# Patient Record
Sex: FEMALE | Race: WHITE | NOT HISPANIC OR LATINO | Employment: FULL TIME | URBAN - METROPOLITAN AREA
[De-identification: names, ages, dates, MRNs, and addresses within clinical notes are randomized per-mention and may not be internally consistent; named-entity substitution may affect disease eponyms.]

---

## 2023-04-19 LAB
ANION GAP IN SER/PLAS: 10 MMOL/L (ref 10–20)
CALCIUM (MG/DL) IN SER/PLAS: 9.5 MG/DL (ref 8.6–10.6)
CARBON DIOXIDE, TOTAL (MMOL/L) IN SER/PLAS: 33 MMOL/L (ref 21–32)
CHLORIDE (MMOL/L) IN SER/PLAS: 102 MMOL/L (ref 98–107)
CREATININE (MG/DL) IN SER/PLAS: 0.78 MG/DL (ref 0.5–1.05)
GFR FEMALE: >90 ML/MIN/1.73M2
GLUCOSE (MG/DL) IN SER/PLAS: 86 MG/DL (ref 74–99)
POTASSIUM (MMOL/L) IN SER/PLAS: 4.1 MMOL/L (ref 3.5–5.3)
SODIUM (MMOL/L) IN SER/PLAS: 141 MMOL/L (ref 136–145)
UREA NITROGEN (MG/DL) IN SER/PLAS: 12 MG/DL (ref 6–23)

## 2023-04-25 LAB — SARS-COV-2 RESULT: NOT DETECTED

## 2023-04-27 ENCOUNTER — HOSPITAL ENCOUNTER (OUTPATIENT)
Dept: DATA CONVERSION | Facility: HOSPITAL | Age: 43
End: 2023-04-28
Attending: ORTHOPAEDIC SURGERY | Admitting: ORTHOPAEDIC SURGERY
Payer: COMMERCIAL

## 2023-04-27 DIAGNOSIS — E78.5 HYPERLIPIDEMIA, UNSPECIFIED: ICD-10-CM

## 2023-04-27 DIAGNOSIS — F32.A DEPRESSION, UNSPECIFIED: ICD-10-CM

## 2023-04-27 DIAGNOSIS — M25.551 PAIN IN RIGHT HIP: ICD-10-CM

## 2023-04-27 DIAGNOSIS — Z87.891 PERSONAL HISTORY OF NICOTINE DEPENDENCE: ICD-10-CM

## 2023-04-27 DIAGNOSIS — M16.11 UNILATERAL PRIMARY OSTEOARTHRITIS, RIGHT HIP: ICD-10-CM

## 2023-04-27 DIAGNOSIS — F41.9 ANXIETY DISORDER, UNSPECIFIED: ICD-10-CM

## 2023-04-27 LAB
ANION GAP IN SER/PLAS: 11 MMOL/L (ref 10–20)
CALCIUM (MG/DL) IN SER/PLAS: 8.3 MG/DL (ref 8.6–10.6)
CARBON DIOXIDE, TOTAL (MMOL/L) IN SER/PLAS: 26 MMOL/L (ref 21–32)
CHLORIDE (MMOL/L) IN SER/PLAS: 104 MMOL/L (ref 98–107)
CREATININE (MG/DL) IN SER/PLAS: 0.72 MG/DL (ref 0.5–1.05)
GFR FEMALE: >90 ML/MIN/1.73M2
GLUCOSE (MG/DL) IN SER/PLAS: 137 MG/DL (ref 74–99)
POTASSIUM (MMOL/L) IN SER/PLAS: 3.7 MMOL/L (ref 3.5–5.3)
SODIUM (MMOL/L) IN SER/PLAS: 137 MMOL/L (ref 136–145)
UREA NITROGEN (MG/DL) IN SER/PLAS: 10 MG/DL (ref 6–23)

## 2023-04-28 LAB
ANION GAP IN SER/PLAS: 9 MMOL/L (ref 10–20)
BASOPHILS (10*3/UL) IN BLOOD BY AUTOMATED COUNT: 0.03 X10E9/L (ref 0–0.1)
BASOPHILS/100 LEUKOCYTES IN BLOOD BY AUTOMATED COUNT: 0.4 % (ref 0–2)
CALCIUM (MG/DL) IN SER/PLAS: 7.9 MG/DL (ref 8.6–10.6)
CARBON DIOXIDE, TOTAL (MMOL/L) IN SER/PLAS: 28 MMOL/L (ref 21–32)
CHLORIDE (MMOL/L) IN SER/PLAS: 104 MMOL/L (ref 98–107)
CREATININE (MG/DL) IN SER/PLAS: 0.7 MG/DL (ref 0.5–1.05)
EOSINOPHILS (10*3/UL) IN BLOOD BY AUTOMATED COUNT: 0.14 X10E9/L (ref 0–0.7)
EOSINOPHILS/100 LEUKOCYTES IN BLOOD BY AUTOMATED COUNT: 1.7 % (ref 0–6)
ERYTHROCYTE DISTRIBUTION WIDTH (RATIO) BY AUTOMATED COUNT: 13.5 % (ref 11.5–14.5)
ERYTHROCYTE MEAN CORPUSCULAR HEMOGLOBIN CONCENTRATION (G/DL) BY AUTOMATED: 31.9 G/DL (ref 32–36)
ERYTHROCYTE MEAN CORPUSCULAR VOLUME (FL) BY AUTOMATED COUNT: 93 FL (ref 80–100)
ERYTHROCYTES (10*6/UL) IN BLOOD BY AUTOMATED COUNT: 3.24 X10E12/L (ref 4–5.2)
GFR FEMALE: >90 ML/MIN/1.73M2
GLUCOSE (MG/DL) IN SER/PLAS: 93 MG/DL (ref 74–99)
HEMATOCRIT (%) IN BLOOD BY AUTOMATED COUNT: 30.1 % (ref 36–46)
HEMOGLOBIN (G/DL) IN BLOOD: 9.6 G/DL (ref 12–16)
IMMATURE GRANULOCYTES/100 LEUKOCYTES IN BLOOD BY AUTOMATED COUNT: 0.4 % (ref 0–0.9)
LEUKOCYTES (10*3/UL) IN BLOOD BY AUTOMATED COUNT: 8.3 X10E9/L (ref 4.4–11.3)
LYMPHOCYTES (10*3/UL) IN BLOOD BY AUTOMATED COUNT: 1.5 X10E9/L (ref 1.2–4.8)
LYMPHOCYTES/100 LEUKOCYTES IN BLOOD BY AUTOMATED COUNT: 18 % (ref 13–44)
MONOCYTES (10*3/UL) IN BLOOD BY AUTOMATED COUNT: 0.64 X10E9/L (ref 0.1–1)
MONOCYTES/100 LEUKOCYTES IN BLOOD BY AUTOMATED COUNT: 7.7 % (ref 2–10)
NEUTROPHILS (10*3/UL) IN BLOOD BY AUTOMATED COUNT: 6 X10E9/L (ref 1.2–7.7)
NEUTROPHILS/100 LEUKOCYTES IN BLOOD BY AUTOMATED COUNT: 71.8 % (ref 40–80)
NRBC (PER 100 WBCS) BY AUTOMATED COUNT: 0 /100 WBC (ref 0–0)
PLATELETS (10*3/UL) IN BLOOD AUTOMATED COUNT: 222 X10E9/L (ref 150–450)
POTASSIUM (MMOL/L) IN SER/PLAS: 3.9 MMOL/L (ref 3.5–5.3)
SODIUM (MMOL/L) IN SER/PLAS: 137 MMOL/L (ref 136–145)
UREA NITROGEN (MG/DL) IN SER/PLAS: 8 MG/DL (ref 6–23)

## 2023-04-29 LAB
ANION GAP IN SER/PLAS: NORMAL
BASOPHILS (10*3/UL) IN BLOOD BY AUTOMATED COUNT: NORMAL
BASOPHILS/100 LEUKOCYTES IN BLOOD BY AUTOMATED COUNT: NORMAL
CALCIUM (MG/DL) IN SER/PLAS: NORMAL
CARBON DIOXIDE, TOTAL (MMOL/L) IN SER/PLAS: NORMAL
CHLORIDE (MMOL/L) IN SER/PLAS: NORMAL
CREATININE (MG/DL) IN SER/PLAS: NORMAL
EOSINOPHILS (10*3/UL) IN BLOOD BY AUTOMATED COUNT: NORMAL
EOSINOPHILS/100 LEUKOCYTES IN BLOOD BY AUTOMATED COUNT: NORMAL
ERYTHROCYTE DISTRIBUTION WIDTH (RATIO) BY AUTOMATED COUNT: NORMAL
ERYTHROCYTE MEAN CORPUSCULAR HEMOGLOBIN CONCENTRATION (G/DL) BY AUTOMATED: NORMAL
ERYTHROCYTE MEAN CORPUSCULAR VOLUME (FL) BY AUTOMATED COUNT: NORMAL
ERYTHROCYTES (10*6/UL) IN BLOOD BY AUTOMATED COUNT: NORMAL
GFR FEMALE: NORMAL
GFR MALE: NORMAL
GLUCOSE (MG/DL) IN SER/PLAS: NORMAL
HEMATOCRIT (%) IN BLOOD BY AUTOMATED COUNT: NORMAL
HEMOGLOBIN (G/DL) IN BLOOD: NORMAL
IMMATURE GRANULOCYTES/100 LEUKOCYTES IN BLOOD BY AUTOMATED COUNT: NORMAL
LEUKOCYTES (10*3/UL) IN BLOOD BY AUTOMATED COUNT: NORMAL
LYMPHOCYTES (10*3/UL) IN BLOOD BY AUTOMATED COUNT: NORMAL
LYMPHOCYTES/100 LEUKOCYTES IN BLOOD BY AUTOMATED COUNT: NORMAL
MANUAL DIFFERENTIAL Y/N: NORMAL
MONOCYTES (10*3/UL) IN BLOOD BY AUTOMATED COUNT: NORMAL
MONOCYTES/100 LEUKOCYTES IN BLOOD BY AUTOMATED COUNT: NORMAL
NEUTROPHILS (10*3/UL) IN BLOOD BY AUTOMATED COUNT: NORMAL
NEUTROPHILS/100 LEUKOCYTES IN BLOOD BY AUTOMATED COUNT: NORMAL
NRBC (PER 100 WBCS) BY AUTOMATED COUNT: NORMAL
PLATELETS (10*3/UL) IN BLOOD AUTOMATED COUNT: NORMAL
POTASSIUM (MMOL/L) IN SER/PLAS: NORMAL
SODIUM (MMOL/L) IN SER/PLAS: NORMAL
UREA NITROGEN (MG/DL) IN SER/PLAS: NORMAL

## 2023-09-07 VITALS
RESPIRATION RATE: 16 BRPM | OXYGEN SATURATION: 96 % | TEMPERATURE: 98.1 F | SYSTOLIC BLOOD PRESSURE: 133 MMHG | HEIGHT: 66 IN | WEIGHT: 221.34 LBS | BODY MASS INDEX: 35.57 KG/M2 | HEART RATE: 84 BPM | DIASTOLIC BLOOD PRESSURE: 82 MMHG

## 2023-09-14 NOTE — PROGRESS NOTES
"      Service: Orthopaedics     Subjective Data:   IRENE ESTRADA is a 43 year old Female who is Hospital Day # 2 and POD #1 for 1. R EFRAIN.     Had syncopal fall during PT session yesterday but dizziness now resolved    Denies CP/SOB/N/T.    Objective Data:     Objective Information:      T   P  R  BP   MAP  SpO2   Value  36.6  75  16  113/72      97%  Date/Time 4/27 23:50 4/27 23:50 4/27 23:50 4/27 23:50    4/27 23:50  Range  (36.5C - 36.7C )  (75 - 91 )  (16 - 16 )  (113 - 132 )/ (72 - 81 )    (95% - 98% )      Pain reported at 4/28 7:36: \"I'm doing good\"    ---- Intake and Output  -----  Mn/Dy/Year Time  Intake   Output  Net  Apr 28, 2023 6:00 am  800   600  200  Apr 27, 2023 10:00 pm  2160   1600  560  Apr 27, 2023 2:00 pm  1800   180  1620    The Intake and Output Totals for the last 24 hours are:      Intake   Output  Net      8460   2380  2380    Physical Exam Narrative:  ·  Physical Exam:    - Constitutional: No acute distress, cooperative  - Eyes: EOM grossly intact  - Head/Neck: Trachea midline  - Respiratory/Thorax: NWOB  - Cardiovascular: RRR on peripheral palpation  - Gastrointestinal: Nondistended  - Psychological: Appropriate mood/behavior  - Skin: Warm and dry. Additional findings in musculoskeletal evaluation  - Musculoskeletal:  --    Right lower extremity:  - dressing cdi   - Compartments soft and compressible  - Fires TA/GS/EHL  - SILT in Nuñez/Sa/SP/DP/T distributions  - Palpable DP pulse.    Recent Lab Results:    Results:        BMP: 4/27/2023 19:14  NA+        Cl-     BUN  /                         137    104    10  /  --------------------------------  Glucose                ---------------------------  137 H    K+     HCO3-   Creat \                         3.7  26    0.72  \  Calcium : 8.3 L    Anion Gap : 11      Assessment and Plan:   Comorbidities:  ·  Comorbidity obesity   ·  Obesity obesity (BMI 35-39.9)   ·  BMI 35.79     Code Status:  ·  Code Status Full Code "     Assessment:    43F with R hip dysplasia and OA now sp R EFRAIN with Dr. Hogan 4/27    Plan:  - Weight-bearing status: WBAT RLE with PHP  - Feeding: Regular diet as tolerated, bowel regimen  - Analgesia: Tylenol 650mg, oxy 5mg/10mg, Dilaudid 0.4mg, Toradol 15mg x24hrs  - Volume: mIV @ at  cc/hr, HLIVF when tolerating PO, monitor BMP  - OT/PT: Consulted  - Respiratory: Encourage IS, maintain O2 sat >92%  - Infection: Annie-operative Ancef for 24 hours, afebrile   - Lines: Maintain PIVx2 while inpatient  - Drains: None  - Turner: None  - Embolic ppx: SCDs, ASA 81mg BID POD1  - Transfusion: Trend CBC daily, no indication for transfusion  - Cardiac: No issues  - Glycemic: No issues  - C/w home medications  - Dispo: discharge home with home health care today after PT session     D/w Dr. Samira Lyons MD  Orthopaedic Surgery PGY1  p. [phone]  Available on [employer]    After 6pm and on weekends please page ortho on-call [phone]    Ortho Joints Service:  1st call: Beni Lyons MD - PGY1 ([phone]  2nd call: Lucas Haase MD - PGY4 ([phone])    Please page [phone] (ortho on-call) after 6pm and on weekends.    Attestation:   Note Completion:  I am a:  Resident/Fellow   Attending Attestation I reviewed the resident/fellow?s documentation and discussed the patient with the resident/fellow.  I agree with the resident/fellow?s medical  decision making as documented in the note.          Electronic Signatures:  Beni Lyons (Resident))  (Signed 28-Apr-2023 07:57)   Authored: Service, Subjective Data, Objective Data, Assessment  and Plan, Note Completion  Juan Hogan)  (Signed 28-Apr-2023 13:27)   Authored: Note Completion   Co-Signer: Service, Subjective Data, Objective Data, Assessment and Plan, Note Completion      Last Updated: 28-Apr-2023 13:27 by Juan Hogan)

## 2023-09-14 NOTE — H&P
History & Physical Reviewed:   Pregnant/Lactating:  ·  Are You Pregnant maybe   ·  Order Pregnancy Test order urine test   ·  Are You Currently Breastfeeding no     I have reviewed the History and Physical dated:  14-Apr-2023   History and Physical reviewed and relevant findings noted. Patient examined to review pertinent physical  findings.: No significant changes   Home Medications Reviewed: no changes noted   Allergies Reviewed: no changes noted       ERAS (Enhanced Recovery After Surgery):  ·  ERAS Patient: no     Consent:   COVID-19 Consent:  ·  COVID-19 Risk Consent Surgeon has reviewed key risks related to the risk of valentín COVID-19 and if they contract COVID-19 what the risks are.     Attestation:   Note Completion:  I am a:  Resident/Fellow   Attending Attestation I saw and evaluated the patient.  I personally obtained the key and critical portions of the history and physical exam or was physically present for key and  critical portions performed by the resident/fellow. I reviewed the resident/fellow?s documentation and discussed the patient with the resident/fellow.  I agree with the resident/fellow?s medical decision making as documented in the note.     I personally evaluated the patient on 27-Apr-2023         Electronic Signatures:  William De La Rosa (Resident))  (Signed 27-Apr-2023 05:31)   Authored: History & Physical Reviewed, ERAS, Consent,  Note Completion  Juan Hogan)  (Signed 27-Apr-2023 13:13)   Authored: Note Completion   Co-Signer: History & Physical Reviewed, ERAS, Consent, Note Completion      Last Updated: 27-Apr-2023 13:13 by Juan Hogan)

## 2023-09-14 NOTE — DISCHARGE SUMMARY
Send Summary:   Discharge Summary Providers:  Provider Role Provider Name   · Attending Juan Hogan   · Referring Malcolm Tony   · Primary Aguila Baca   · Primary Jaclyn Morton       Note Recipients: Aguila Baca PA (PHYSICIAN)  - [phone] []  Juan Hogan MD       Discharge:    Summary:   Admission Date: .27-Apr-2023 05:35:00   Discharge Date: 28-Apr-2023   Attending Physician at Discharge: Juan Hogan   Admission Reason: R EFRAIN   Final Discharge Diagnoses: Osteoarthritis of right  hip   Procedures: Date: 27-Apr-2023 10:07:00  Procedure Name: 1. R EFRAIN   Condition at Discharge: Satisfactory   Disposition at Discharge: Home Health Care - New   Hospital Course:     Patient is now s/p R EFRAIN on 4/27 by Dr. Hogan. On the day of surgery, patient was identified in the pre-operative holding area and agreeable to proceed with surgery.  Written consent was obtained.  Please see operative note for further details of this procedure. Patient received 24 hours of jaimie-operative antibiotics. Patient recovered in the PACU before transfer to a regular nursing floor. Patient was started on oxycodone  and tylenol, for pain control and ASA 81 mg bid for DVT prophylaxis. On the day of discharge, patient was afebrile with stable vital signs. Patient was neurovascularly intact at time of discharge. Patient was discharged with prescription of ASA 81 mg  bid for DVT prophylaxis for 4 weeks. Patient will follow-up with Dr. Hogan in 2 weeks for post-operative visit.       Discharge Information:    and Continuing Care:   Lab Results - Pending:    None  Radiology Results - Pending: None   Discharge Instructions:    Activity:           activity as tolerated.          May shower..  with dressing in place          May not return to school/work until follow-up visit with.  Dr. Hogan/Balbina Cintron PAC            May not drive for 6 week(s).            Weight-bearing Instructions: weight-bearing  as tolerated right leg.            Please enforce hip precautions: foam wedge between legs, no hip flexion, internal rotation, adduction.    Nutrition/Diet:           resume normal diet    Wound Care:           Wound Site:   right hip          Wound Type:   surgical incision          Instructions:   no lotions, creams, or tub soaks          Other Instructions:   May remove dressing 7 days after date of discharge from the hospital. Keep incision covered with dry gauze dressing until followup appointment, changing dressing daily after dressing is removed. Avoid any garments that may  cause pressure or friction over the incision line. Keep incisions clean, dry, and covered with a dressing. No lotions, powders, or creams over incision lines. No tub soaks. Staples and/or sutures will be removed in the office during your follow up appointment.  Call Dr. Hogan or Balbina Granados PA-C immediately at [phone] if you notice any sign of infection such as increased redness, warmth, thick drainage, wound separation, or spiking fever/chills.    Home Care Certification:           Home Care Agency:    [facility] [phone]          Skilled Disciplines Ordered:   PT,  OT    Home Care Services:           Home Care Skilled Service:   Rehab (PT/OT/SP eval and treat)    Follow Up Appointments:    Follow-Up Appointment 01:           Physician/Dept/Service:   Balbina Cintron PAC - Orthopaedic Trauma Surgery          Reason for Referral:   post-operative follow up          Scheduled Date/Time:   17-May-2023 10:30          Location:   [facility], [address] [place] [address] [address]. [facility], [place] [address]          Phone Number:   [phone]    Discharge Medications: Home Medication   acetaminophen 500 mg oral tablet - 1 tab(s) orally every 6 hours   Adult Aspirin Regimen 81 mg oral delayed release tablet - 1 tab(s) orally 2 times a day   Colace 100 mg oral capsule - 1 cap(s) orally 2 times a day   meloxicam 15 mg oral tablet - 1  tab(s) orally once a day   oxyCODONE 5 mg oral capsule - 1 cap(s) orally every 6 hours   pantoprazole 40 mg oral delayed release tablet - 1 tab(s) orally once a day   traMADol 50 mg oral tablet - 1 tab(s) orally every 6 hours  cyclobenzaprine 10 mg oral tablet - 1 tab(s) orally every 8 hours   atorvastatin 40 mg oral tablet - 1 tab(s) oral once a day  cbd with melatonin - 0     triamcinolone topical 0.025% topical ointment - 1  topical prn  Wellbutrin  mg/24 hours oral tablet, extended release - 1 tab(s) oral once a day     PRN Medication   hydrOXYzine hydrochloride 10 mg oral tablet - 1 cap(s) orally 1 to 4 times a day, As Needed - for anxiety     DNR Status:   ·  Code Status Code Status order at time of discharge: Full Code     Attestation:   Note Completion:  I am a:  Resident/Fellow   Attending Attestation I reviewed the resident/fellow?s documentation and discussed the patient with the resident/fellow.  I agree with the resident/fellow?s medical  decision making as documented in the note.          Electronic Signatures:  Beni Lyons (Resident))  (Signed 28-Apr-2023 17:05)   Authored: Send Summary, Summary Content, Ongoing Care,  DNR Status, Note Completion  Juan Hogan)  (Signed 01-May-2023 09:07)   Authored: Note Completion   Co-Signer: Send Summary, Summary Content, Ongoing Care, DNR Status, Note Completion      Last Updated: 01-May-2023 09:07 by Juan Hogan)

## 2023-09-14 NOTE — PROGRESS NOTES
Service: Anesthesia - Pain     Subjective Data:   IRENE ESTRADA is a 43 year old Female who is Hospital Day # 2 and POD #1 for 1. R EFRAIN.    Additional Information:    Postop Pain Issues -   Palliative: relieved with IV analgesics and regional local anesthetics  Provocative: with movement  Quality: burning/aching  Radiation: none  Severity: 6/10  Timing: constant  24 Hour opioid consumption: Oxy 40mg, Tramadol 50mg    Objective Data:     Objective Information:      T   P  R  BP   MAP  SpO2   Value  36.3  79  16  114/76      99%  Date/Time 4/28 8:18 4/28 8:18 4/28 8:18 4/28 8:18    4/28 8:18  Range  (36.3C - 36.7C )  (75 - 91 )  (16 - 16 )  (113 - 132 )/ (72 - 81 )    (95% - 99% )      Pain reported at 4/28 8:03: 4 = Moderate    ---- Intake and Output  -----  Mn/Dy/Year Time  Intake   Output  Net  Apr 28, 2023 6:00 am  800   600  200  Apr 27, 2023 10:00 pm  2160   1600  560  Apr 27, 2023 2:00 pm  1800   180  1620    The Intake and Output Totals for the last 24 hours are:      Intake   Output  Net      4760   2380  2380    Physical Exam Narrative:  ·  Physical Exam:    GENERAL: NAD, A+Ox3.  HEENT: Sclera non-icteric.  RESPIRATORY: Symmetrical chest expansion.  CARDIAC: Regular rate.  ABDOMEN: Non-distended.  MSK: POLANCO.  NEURO: No focal neurologic deficits.  EXTREMITIES: No edema noted.  SKIN: No jaundice. No bruises noted.  PSYCHE: Mood and behavior appropriate.     Recent Lab Results:    Results:    CBC: 4/28/2023 07:27              \     Hgb     /                              \     9.6 L    /  WBC  ----------------  Plt               8.3       ----------------    222              /     Hct     \                              /     30.1 L    \            RBC: 3.24 L    MCV: 93     Neutrophil %: 71.8      BMP: 4/28/2023 07:27  NA+        Cl-     BUN  /                         137    104    8  /  --------------------------------  Glucose                ---------------------------  93    K+     HCO3-    Creat \                         3.9  28    0.70  \  Calcium : 7.9 L    Anion Gap : 9 L      Assessment and Plan:   Daily Risk Screen:  ·  Does patient have an indwelling urinary catheter? n/a consulting service   ·  Does patient have a central line? n/a consulting service     Code Status:  ·  Code Status Full Code     Assessment:    IRENE ESTRADA is a 43 year old Female who is presenting for R EFRAIN with Dr. Gipson on 4/27/23. Acute pain service consulted for assistance with postop pain  control.    - R QL ss blocks performed preoperatively 4/27/23  - Pain medications per primary team  - Acute pain will sign off. Please don't hesitate to contact us with any questions or concerns.     Acute Pain Resident  pg [phone] ph [phone]     Attestation:   Note Completion:  I am a:  Resident/Fellow   Attending Attestation I saw and evaluated the patient.  I personally obtained the key and critical portions of the history and physical exam or was physically present for key and  critical portions performed by the resident/fellow. I reviewed the resident/fellow?s documentation and discussed the patient with the resident/fellow.  I agree with the resident/fellow?s medical decision making as documented in the note.     I personally evaluated the patient on 28-Apr-2023         Electronic Signatures:  Robbie Lehman (Resident))  (Signed 28-Apr-2023 11:08)   Authored: Service, Subjective Data, Objective Data, Assessment  and Plan, Note Completion  Elana Yip)  (Signed 02-May-2023 16:58)   Authored: Note Completion   Co-Signer: Service, Subjective Data, Objective Data, Assessment and Plan, Note Completion      Last Updated: 02-May-2023 16:58 by Elana iYp)

## 2023-10-02 NOTE — OP NOTE
Post Operative Note:     PreOp Diagnosis: R hip dysplasia and arthritis   Post-Procedure Diagnosis: R hip dysplasia and arthritis   Procedure: 1. R EFRAIN   Surgeon: Samira   Resident/Fellow/Other Assistant: Abdulaziz De La Rosa   Estimated Blood Loss (mL): 150   Specimen: no   Findings: c/w diagnosis   Additional Details: Plan:  - Anticipate SDD  - WBAT  - 24hrs keflex  - ASA 81mg BID  - PO analgesia     Attestation:   Note Completion:  I am a: Resident/Fellow   Attending Attestation I was present for key portions of the procedure and the procedure lasted longer than 5 minutes.          Electronic Signatures:  William De La Rosa (Resident))  (Signed 27-Apr-2023 10:09)   Authored: Post Operative Note, Note Completion  Juan Hogan)  (Signed 27-Apr-2023 13:18)   Authored: Note Completion   Co-Signer: Post Operative Note, Note Completion      Last Updated: 27-Apr-2023 13:18 by Juan Hogan)

## 2023-10-29 PROBLEM — M25.871 IMPINGEMENT OF RIGHT ANKLE JOINT: Status: ACTIVE | Noted: 2023-10-29

## 2023-10-29 PROBLEM — S93.411A SPRAIN OF CALCANEOFIBULAR LIGAMENT OF RIGHT ANKLE: Status: ACTIVE | Noted: 2023-10-29

## 2023-10-29 PROBLEM — M25.851 FEMOROACETABULAR IMPINGEMENT OF RIGHT HIP: Status: ACTIVE | Noted: 2023-10-29

## 2023-10-29 PROBLEM — M25.371 OTHER INSTABILITY, RIGHT ANKLE: Status: ACTIVE | Noted: 2023-10-29

## 2023-10-29 PROBLEM — Q65.89 HIP DYSPLASIA (HHS-HCC): Status: ACTIVE | Noted: 2023-10-29

## 2023-10-29 PROBLEM — S93.491S: Status: ACTIVE | Noted: 2023-10-29

## 2023-10-29 PROBLEM — M67.88 RIGHT PERONEAL TENDINOSIS: Status: ACTIVE | Noted: 2023-10-29

## 2023-10-29 RX ORDER — ATORVASTATIN CALCIUM 40 MG/1
TABLET, FILM COATED ORAL
COMMUNITY
Start: 2022-03-17

## 2023-10-29 RX ORDER — BUPROPION HCL 300 MG
TABLET, EXTENDED RELEASE 24 HR ORAL
COMMUNITY
Start: 2021-04-23

## 2023-10-29 RX ORDER — CHLORHEXIDINE GLUCONATE ORAL RINSE 1.2 MG/ML
SOLUTION DENTAL
COMMUNITY
Start: 2023-04-14

## 2023-10-29 RX ORDER — TRIAMCINOLONE ACETONIDE 0.25 MG/G
OINTMENT TOPICAL 2 TIMES DAILY PRN
COMMUNITY
Start: 2021-12-13

## 2023-10-29 RX ORDER — HYDROXYZINE HYDROCHLORIDE 10 MG/1
1-2 TABLET, FILM COATED ORAL
COMMUNITY
Start: 2023-02-24

## 2023-10-29 RX ORDER — MELOXICAM 15 MG/1
1 TABLET ORAL DAILY
COMMUNITY
Start: 2022-01-20

## 2023-10-29 RX ORDER — BUPROPION HYDROCHLORIDE 150 MG/1
TABLET ORAL
COMMUNITY
Start: 2022-05-23

## 2023-10-31 ENCOUNTER — OFFICE VISIT (OUTPATIENT)
Dept: ORTHOPEDIC SURGERY | Facility: CLINIC | Age: 43
End: 2023-10-31
Payer: COMMERCIAL

## 2023-10-31 DIAGNOSIS — M17.12 ARTHRITIS OF LEFT KNEE: ICD-10-CM

## 2023-10-31 DIAGNOSIS — Z98.890 HX OF RECONSTRUCTION OF ANTERIOR CRUCIATE LIGAMENT TEAR: ICD-10-CM

## 2023-10-31 DIAGNOSIS — M25.561 ACUTE BILATERAL KNEE PAIN: Primary | ICD-10-CM

## 2023-10-31 DIAGNOSIS — M25.562 ACUTE BILATERAL KNEE PAIN: Primary | ICD-10-CM

## 2023-10-31 PROCEDURE — 1036F TOBACCO NON-USER: CPT | Performed by: ORTHOPAEDIC SURGERY

## 2023-10-31 PROCEDURE — 99203 OFFICE O/P NEW LOW 30 MIN: CPT | Performed by: ORTHOPAEDIC SURGERY

## 2023-10-31 PROCEDURE — 20610 DRAIN/INJ JOINT/BURSA W/O US: CPT | Performed by: ORTHOPAEDIC SURGERY

## 2023-10-31 PROCEDURE — 3008F BODY MASS INDEX DOCD: CPT | Performed by: ORTHOPAEDIC SURGERY

## 2023-10-31 RX ORDER — BUPIVACAINE HYDROCHLORIDE 2.5 MG/ML
1 INJECTION, SOLUTION INFILTRATION; PERINEURAL
Status: COMPLETED | OUTPATIENT
Start: 2023-10-31 | End: 2023-10-31

## 2023-10-31 RX ORDER — TRIAMCINOLONE ACETONIDE 40 MG/ML
40 INJECTION, SUSPENSION INTRA-ARTICULAR; INTRAMUSCULAR
Status: COMPLETED | OUTPATIENT
Start: 2023-10-31 | End: 2023-10-31

## 2023-10-31 RX ADMIN — TRIAMCINOLONE ACETONIDE 40 MG: 40 INJECTION, SUSPENSION INTRA-ARTICULAR; INTRAMUSCULAR at 09:56

## 2023-10-31 RX ADMIN — BUPIVACAINE HYDROCHLORIDE 1 ML: 2.5 INJECTION, SOLUTION INFILTRATION; PERINEURAL at 09:56

## 2023-10-31 ASSESSMENT — PATIENT HEALTH QUESTIONNAIRE - PHQ9
10. IF YOU CHECKED OFF ANY PROBLEMS, HOW DIFFICULT HAVE THESE PROBLEMS MADE IT FOR YOU TO DO YOUR WORK, TAKE CARE OF THINGS AT HOME, OR GET ALONG WITH OTHER PEOPLE: NOT DIFFICULT AT ALL
SUM OF ALL RESPONSES TO PHQ9 QUESTIONS 1 & 2: 1
1. LITTLE INTEREST OR PLEASURE IN DOING THINGS: NOT AT ALL
2. FEELING DOWN, DEPRESSED OR HOPELESS: SEVERAL DAYS

## 2023-10-31 ASSESSMENT — PAIN SCALES - GENERAL: PAINLEVEL_OUTOF10: 4

## 2023-10-31 ASSESSMENT — PAIN - FUNCTIONAL ASSESSMENT: PAIN_FUNCTIONAL_ASSESSMENT: 0-10

## 2023-10-31 ASSESSMENT — ENCOUNTER SYMPTOMS
KNEE DEFORMITY: 1
KNEE SWELLING: 1

## 2023-10-31 ASSESSMENT — PAIN DESCRIPTION - DESCRIPTORS: DESCRIPTORS: ACHING;DISCOMFORT

## 2023-10-31 NOTE — PROGRESS NOTES
Subjective    Patient ID: Kim Aguayo is a 43 y.o. female.    Chief Complaint: Pain of the Right Knee (XRAYS / MRI ONLINE) and Pain of the Left Knee (XRAYS / MRI ONLINE)       Right Knee     Left Knee       This 43-year-old female is here complaining of progressive pain and occasional swelling in her left knee.  She has a significant history of ACL reconstruction after a soccer injury almost 25 years ago.  She had a subsequent ACL reconstruction at that time.  She recently had an MRI of her left knee due to progressive pain.  She denies any interim injuries.  She points to the lateral aspect of her knee as being the source of pain.  She denies any significant instability.  She specifically complains of pain with stairs and arising from a seated position.  Objective   Ortho Exam  On physical examination she is noted to be a somewhat overweight female.  Examination of the left knee reveals there to be no significant deformity.  She has good range of motion with full extension and flexion to 120 degrees.  There is no varus or valgus instability.  There are well-healed scars over the anterior aspect of the knee specifically over the anterior aspect of the patellar tendon.  There is some crepitus with flexion and extension in the subpatellar area.  Lachman's test and drawer test are negative.  There is some mild pain to palpation over the medial joint line.  Image Results:  An MRI done at [facility] on 10/25/2023 was reviewed.  It showed evidence of severe medial compartment arthritis as well as moderate lateral patellofemoral arthritis.  There was also evidence of previous ACL reconstruction and near complete medial meniscectomy with the graft appearing to be still intact.    Assessment/Plan   Encounter Diagnoses:  Acute bilateral knee pain  Arthritis of left knee  History of ACL reconstruction left knee      Patient ID: Kim Aguayo is a 43 y.o. female.    L Inj/Asp: L knee on 10/31/2023 9:56  AM  Indications: pain  Details: 25 G needle, anteromedial approach  Medications: 40 mg triamcinolone acetonide 40 mg/mL; 1 mL BUPivacaine HCl 0.25 % (2.5 mg/mL)  Outcome: tolerated well, no immediate complications  Procedure, treatment alternatives, risks and benefits explained, specific risks discussed. Immediately prior to procedure a time out was called to verify the correct patient, procedure, equipment, support staff and site/side marked as required. Patient was prepped and draped in the usual sterile fashion.       Prior to the knee injection I discussed the clinical findings and MRI findings with her.  We did obtain the radiology reading of the MRI as well.  I discussed the fact that her symptoms seem to be coming from degenerative arthritis both in the patellofemoral area as well as the medial compartment.  I discussed the fact that injections can be done every 3 months if necessary.  I also briefly discussed viscosupplementation and the eventual need for total knee arthroplasty.  She is to return on an as-needed basis.

## 2023-11-28 ENCOUNTER — HOSPITAL ENCOUNTER (OUTPATIENT)
Dept: RADIOLOGY | Facility: HOSPITAL | Age: 43
Discharge: HOME | End: 2023-11-28
Payer: COMMERCIAL

## 2023-11-28 VITALS — HEIGHT: 66 IN | WEIGHT: 230 LBS | BODY MASS INDEX: 36.96 KG/M2

## 2023-11-28 DIAGNOSIS — Z12.31 SCREENING MAMMOGRAM FOR BREAST CANCER: ICD-10-CM

## 2023-11-28 PROCEDURE — 77067 SCR MAMMO BI INCL CAD: CPT

## 2024-03-06 NOTE — CONSULTS
Service:   Service: Anesthesia - Pain     Consult:  Consult requested by (Attending Name): Brandan   Reason: post op analgesia     History of Present Illness:   HPI:    IRENE ESTRADA is a 43 year old Female who is presenting for R EFRAIN with Dr. Gipson on 4/27/23. Acute pain service consulted for assistance with postop pain  control.    Anticipated Postop Pain Issues -   Palliative: typically relieved with IV analgesics and regional local anesthetics  Provocative: typically with movement  Quality: typically burning and aching  Radiation: typically none  Severity: typically severe 8-10/10  Timing: typically constant     PMHx: none   PSHx: Knee arthroscopy, ankle surgery   SH: former smoker (12 years ago)  Fam Hx: HTN, Ca  Alllergies: NKDA    Review of Systems:  Constitutional: NEGATIVE: Chills  Eyes: NEGATIVE: Redness  ENMT: NEGATIVE: Ear Pain  Respiratory: NEGATIVE: Hemoptysis  Cardiac: NEGATIVE: Syncope  Genitourinary: NEGATIVE: Discharge  Neurological: NEGATIVE: Confusion  Psychiatric: NEGATIVE: Hallucinations  Skin: NEGATIVE: Mass  Endocrine: NEGATIVE: Thirst    Physical Exam:  GENERAL: NAD, A+Ox3.  HEENT: Sclera non-icteric.  RESPIRATORY: Symmetrical chest expansion.  CARDIAC: Regular rate.  ABDOMEN: Non-distended.  MSK: POLANCO.  NEURO: No focal neurologic deficits.  EXTREMITIES: No edema noted.  SKIN: No jaundice. No bruises noted.  PSYCHE: Mood and behavior appropriate.            Allergies:  ·  No Known Allergies :     Objective:   Physical Exam Narrative:  ·  Physical Exam:    GENERAL: NAD, A+Ox3.  HEENT: Sclera non-icteric.  RESPIRATORY: Symmetrical chest expansion.  CARDIAC: Regular rate.  ABDOMEN: Non-distended.  MSK: POLANCO.  NEURO: No focal neurologic deficits.  EXTREMITIES: No edema noted.  SKIN: No jaundice. No bruises noted.  PSYCHE: Mood and behavior appropriate.       Assessment:    IRENE ESTRADA is a 43 year old Female who is presenting for R EFRAIN with Dr. Gipson on 4/27/23.  Acute pain service consulted for assistance with postop pain  control.    - R QL ss blocks performed preoperatively 4/27/23  - Pain medications per primary team  - Will see on POD1    Acute Pain Resident  pg [phone] ph [phone]     Attestation:   Note Completion:  I am a:  Resident/Fellow   Attending Attestation I saw and evaluated the patient.  I personally obtained the key and critical portions of the history and physical exam or was physically present for key and  critical portions performed by the resident/fellow. I reviewed the resident/fellow?s documentation and discussed the patient with the resident/fellow.  I agree with the resident/fellow?s medical decision making as documented in the note.     I personally evaluated the patient on 27-Apr-2023         Electronic Signatures:  Robbie Lehman (Resident))  (Signed 27-Apr-2023 08:10)   Authored: Service, History of Present Illness, Allergies,  Objective, Assessment/Recommendations, Note Completion  Elana Yip)  (Signed 27-Apr-2023 10:57)   Authored: Note Completion   Co-Signer: Service, History of Present Illness, Allergies, Objective, Assessment/Recommendations, Note Completion      Last Updated: 27-Apr-2023 10:57 by Elana Yip)

## 2024-04-19 NOTE — OP NOTE
PREOPERATIVE DIAGNOSIS:  Right hip acetabular dysplasia, with osteoarthritis.    POSTOPERATIVE DIAGNOSIS:  Right hip acetabular dysplasia, with osteoarthritis.    OPERATION/PROCEDURE:  Total hip arthroplasty, which was of increased complexity and  operative time due to the patient's underlying dysplasia and altered  pelvic anatomy with insufficient posterior wall coverage.     SURGEON:  Juan Hogan MD.    ASSISTANT(S):  1. Celeste Cintron PA-C, who was critical and essential to all aspects  of this case as a first assistant as there was no qualified resident  available to participate.   2. William De La Rosa MD, PGY-3.    ANESTHESIA:  Spinal plus block.    ESTIMATED BLOOD LOSS:  Please see Anesthesia record.    COMPLICATIONS:  None.Intra-operative  calcar fracture     SPECIMENS:  None.    DISPOSITION:  Stable.    OPERATIVE INDICATIONS:  This is a 43-year-old with __________ hip  dysplasia not  a candidate for hip preservation  surgery due to  the presence of arthritis and her age.  Surgery is indicated to  restore function and allow for return of ambulatory status, for pain  relief.  Risks and benefits of surgery were discussed with the  patient included, but not limited to death, infection, bleeding,  neurologic damage, component malposition, postoperative instability,  limb length inequality, she understood and elected to proceed.     DETAILS OF OPERATION:  The patient was met in the preoperative holding area, identified by  name, medical record number, met by members of Orthopedic and  Anesthesia team, transferred to the operating room, positioned  lateral on a pegboard, standard operating room table with a pegboard  positioner.  Axillary roll was utilized.  All bony prominences were  padded.  Preoperative antibiotic prophylaxis and tranexamic acid were  administered.  Preoperative time-out was performed by myself prior to  prepping.  The correct operative site was identified.  Limb was then  prepped  and draped in the usual sterile fashion using ChloraPrep.  A  curvilinear incision was then made centering over the greater  trochanter.  Skin and subcutaneous tissue were dissected with a 10  blade scalpel.  Subcutaneous dissection with the Bovie.  Hemostasis  was obtained.  The iliotibial band and gluteus sonja fascia  divided.  Hohmann retractor was then placed.  Her subcutaneous fat  layer was quite robust, which also added to the longevity and  complexity of the case.  We then anteriorly retracted her gluteus  minimus and medius and performed a posterior capsulotomy including  the piriformis tendon.  We then dislocated the hip through the  capsulotomy and made a femoral neck osteotomy about a cm proximal to  the lesser trochanter.  We then retracted the femur anteriorly and  repaired the acetabulum by removing the peripheral soft tissue  structures and reaming with hemispherical reamers up to a size 51.  The posterior wall was quite stationed as she had retroversion and  her superior wall was actually insufficient as well due to her  dysplasia.  We did deepen her socket as safe as we could without  perforating the medial wall.  She still had quite a significant  deficiency of her posterior wall without over reaming.  We placed a  51 trial with good fit in the pelvis and placed a size 52 Gription  [place] sector cup __________ in  40 degrees of abduction and 30 degrees  of anteversion.  We then placed a trial liner and flexed, internally  rotated the femur.  We __________ then  prepared the proximal femur 1st by using a  box osteotome and a canal finder.  She had a very tight canal.  We  used a reamer for the axis stem to remove diaphyseal bone as it was  getting hung up in the diaphysis.  The proximal femoral aperture  through the femoral neck osteotomy was also quite narrow and required  extensive curetting and broaching just opened this enough to actually  broach up to a size.  We were able to broach up to  a size 3 and  trialed with a high offset neck and a +5, 36 mm head.  We took  intraoperative x-rays.  The cuff appeared to be a little overly  anteverted.  Her pelvis was rotated to correct for rotation.  Otherwise, her stem was sized __________ appropriately  and we then started to place the  size 3 high offset active stem and upon final seating noted small  propagation of unicortical fracture line in the posterior calcar.  We  then removed the stem itself.  Also would not __________to seat  to the  previous position of the broach, so we removed the stem placed a  prophylactic cable to prevent fracture propagation and rebroached for  the size 3 to deepen the area.  We then replaced the stem carefully  and monitored the fracture.  It was stable and remained unicortical  and did not propagate past the level of the cable.  We then placed a  +5, 36 mm head, reduced the hip, it was quite stable.  Limb lengths  were restored.  We also taken intraoperative x-ray to make sure there  was no visualization of the fracture line on x-ray.  I was happy with  the hip restoration at this point.  We then copiously irrigated with  normal saline and IrriSept.  We placed vancomycin and tobramycin  powder deep to prevent infection, repaired the posterior lateral  capsule and piriformis through bone tunnels in the greater trochanter  and closed the remainder of the wound in layered and standard  fashion.  I was present for the critical aspects of the procedure.     POSTOPERATIVE PLAN:  Weightbearing as tolerated, right lower extremity.  24 hours of IV  Ancef for infection prophylaxis. Calcium, vitamin D protocol for bone  health.  DVT prophylaxis with aspirin.  I will see her back in the  office in 2 weeks' time for a wound check, standing AP pelvis and AP  and cross-table lateral views of the right hip.     Juan Hogan MD    DD:  04/27/2023 17:51:05 EST  DT:  04/28/2023 14:22:37 EST  DICTATION NUMBER:  [id number]  INTERNAL JOB  NUMBER:  [id number]    CC:  Juan Hogan MD, Fax: [phone]  Malcolm Tony MD, Fax: [phone]         Electronic Signatures:  Juan Hogan (MD) (Signed on 01-May-2023 09:18)   Authored   Unsigned, Draft ([employer] GENERATED) (Entered on 28-Apr-2023 14:22)   Entered     Last Updated: 01-May-2023 15:39 by Juan Hogan)

## 2024-10-28 DIAGNOSIS — M25.851 FEMOROACETABULAR IMPINGEMENT OF RIGHT HIP: Primary | ICD-10-CM

## 2024-10-28 RX ORDER — AMOXICILLIN 500 MG/1
2000 TABLET, FILM COATED ORAL ONCE
Qty: 4 TABLET | Refills: 3 | Status: SHIPPED | OUTPATIENT
Start: 2024-10-28 | End: 2024-10-28

## 2025-06-17 ENCOUNTER — HOSPITAL ENCOUNTER (OUTPATIENT)
Dept: RADIOLOGY | Facility: HOSPITAL | Age: 45
Discharge: HOME | End: 2025-06-17
Payer: COMMERCIAL

## 2025-06-17 DIAGNOSIS — Z12.31 SCREENING MAMMOGRAM FOR BREAST CANCER: ICD-10-CM

## 2025-06-17 PROCEDURE — 77063 BREAST TOMOSYNTHESIS BI: CPT | Performed by: RADIOLOGY

## 2025-06-17 PROCEDURE — 77067 SCR MAMMO BI INCL CAD: CPT | Performed by: RADIOLOGY

## 2025-06-17 PROCEDURE — 77063 BREAST TOMOSYNTHESIS BI: CPT

## 2025-09-25 ENCOUNTER — APPOINTMENT (OUTPATIENT)
Dept: UROLOGY | Facility: CLINIC | Age: 45
End: 2025-09-25
Payer: COMMERCIAL

## 2025-10-22 ENCOUNTER — APPOINTMENT (OUTPATIENT)
Dept: ORTHOPEDIC SURGERY | Facility: CLINIC | Age: 45
End: 2025-10-22
Payer: COMMERCIAL